# Patient Record
Sex: MALE | Employment: FULL TIME | URBAN - METROPOLITAN AREA
[De-identification: names, ages, dates, MRNs, and addresses within clinical notes are randomized per-mention and may not be internally consistent; named-entity substitution may affect disease eponyms.]

---

## 2024-09-10 ENCOUNTER — APPOINTMENT (OUTPATIENT)
Dept: ORTHOPEDIC SURGERY | Facility: CLINIC | Age: 24
End: 2024-09-10
Payer: COMMERCIAL

## 2024-09-10 DIAGNOSIS — S69.91XA FINGER INJURY, RIGHT, INITIAL ENCOUNTER: ICD-10-CM

## 2024-09-11 ENCOUNTER — OFFICE VISIT (OUTPATIENT)
Dept: ORTHOPEDIC SURGERY | Facility: CLINIC | Age: 24
End: 2024-09-11
Payer: COMMERCIAL

## 2024-09-11 DIAGNOSIS — S68.112A TRAUMATIC AMPUTATION OF TIP OF RIGHT MIDDLE FINGER, INITIAL ENCOUNTER: Primary | ICD-10-CM

## 2024-09-11 PROCEDURE — 99214 OFFICE O/P EST MOD 30 MIN: CPT | Performed by: FAMILY MEDICINE

## 2024-09-11 RX ORDER — HYDROCODONE BITARTRATE AND ACETAMINOPHEN 5; 325 MG/1; MG/1
TABLET ORAL
COMMUNITY
Start: 2024-08-31

## 2024-09-11 RX ORDER — IBUPROFEN 600 MG/1
600 TABLET ORAL EVERY 6 HOURS PRN
Qty: 30 TABLET | Refills: 0 | Status: SHIPPED | OUTPATIENT
Start: 2024-09-11 | End: 2024-09-21

## 2024-09-11 RX ORDER — CEPHALEXIN 500 MG/1
CAPSULE ORAL
COMMUNITY
Start: 2024-08-31

## 2024-09-11 RX ORDER — IBUPROFEN 600 MG/1
TABLET ORAL
COMMUNITY
Start: 2024-08-31 | End: 2024-09-11 | Stop reason: ALTCHOICE

## 2024-09-11 NOTE — LETTER
September 11, 2024     Patient: Patrick Trejo   YOB: 2000   Date of Visit: 9/11/2024       To Whom It May Concern:    It is my medical opinion that Patrick Trejo  should remain out of work for the next 4 weeks .    If you have any questions or concerns, please don't hesitate to call.         Sincerely,        William Bernal MD    CC: No Recipients

## 2024-09-11 NOTE — PROGRESS NOTES
History of Present Illness   Chief Complaint   Patient presents with    OTHER     [facility] DOI 8/30/24  PATIENT WAS OPERATING A TRAILER AND THE HYDRAULIC FELL ON THE TIP OF HIS FINGER        The patient is 24 y.o. male  here with a complaint of right middle finger injury.  This is a work-related injury that occurred on 8/30/2024 little less than 2 weeks ago.  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  He was seen in an emergency department in [place] following the injury, he had x-rays obtained which revealed a fingertip amputation.  Patient was started on Keflex as well as some pain medication.  Upon his return home he did seek evaluation at [facility] wound clinic, he was given some wound care supplies, they do plan to see him on a every 2-week basis, did recommend orthopedic evaluation prompting him to be seen by me today.  He does admit to some throbbing discomfort in the tip of his finger.  He denies any active bleeding, foul smell or drainage.  Patient is currently out of work.    No past medical history on file.    Medication Documentation Review Audit    **Prior to Admission medications have not yet been reviewed**         No Known Allergies    Social History     Socioeconomic History    Marital status: Single     Spouse name: Not on file    Number of children: Not on file    Years of education: Not on file    Highest education level: Not on file   Occupational History    Not on file   Tobacco Use    Smoking status: Some Days     Types: Cigarettes    Smokeless tobacco: Never   Substance and Sexual Activity    Alcohol use: Yes    Drug use: Not Currently    Sexual activity: Not on file   Other Topics Concern    Not on file   Social History Narrative    Not on file     Social Determinants of Health     Financial  Resource Strain: Not on file   Food Insecurity: Not on file   Transportation Needs: Not on file   Physical Activity: Not on file   Stress: Not on file   Social Connections: Not on file   Intimate Partner Violence: Not on file   Housing Stability: Not on file       No past surgical history on file.       Review of Systems   GENERAL: Negative  GI: Negative  MUSCULOSKELETAL: See HPI  SKIN: Negative  NEURO:  Negative     Physical Exam:    General/Constitutional: well appearing, no distress, appears stated age  HEENT: sclera clear  Respiratory: non labored breathing  Vascular: No edema, swelling or tenderness, except as noted in detailed exam.  Integumentary: No impressive skin lesions present, except as noted in detailed exam.  Neurological:  Alert and oriented   Psychological:  Normal mood and affect.  Musculoskeletal: Normal, except as noted in detailed exam and in HPI.      Right hand middle finger.  There is a subacute traumatic amputation of the distal phalanx just distal to the DIP joint, nail is fully amputated.  There is evidence of healing of the tip of the wound with granulation tissue present.  There is no redness warmth or drainage.  He can flex and extend the DIP joint a few degrees.  He is tender palpation of the tip of the finger.  Rest of the hand is unremarkable.       Imaging: X-rays from outside emergency department available on his phone shows traumatic amputation of the majority of the distal phalanx      Assessment   1. Traumatic amputation of tip of right middle finger, initial encounter  ibuprofen 600 mg tablet            Plan: Discussed diagnosis, reviewed further workup and treatment per we also discussed case with hand surgeon Dr. Burns who agreed with conservative/nonoperative management.  He will continue with wound care, he was given some Xeroform gauze but has some supplies from wound care as well, plan to see them twice a week for the next 3 to 4 weeks.  Anticipate he will do well with  this, no indication for any surgical intervention.  With regards to work we will plan to keep him out of work for at least the next 4 weeks.  He will monitor for any signs of infection.  Instructed to follow back up with me sooner if any concerns from wound care otherwise I will plan to see him back in 4 weeks for reassessment.  I did provide a refill of ibuprofen to assist in pain control.